# Patient Record
Sex: FEMALE | Race: WHITE | Employment: OTHER | URBAN - METROPOLITAN AREA
[De-identification: names, ages, dates, MRNs, and addresses within clinical notes are randomized per-mention and may not be internally consistent; named-entity substitution may affect disease eponyms.]

---

## 2017-01-09 ENCOUNTER — ONCOLOGY VISIT (OUTPATIENT)
Dept: ONCOLOGY | Facility: CLINIC | Age: 72
End: 2017-01-09
Payer: COMMERCIAL

## 2017-01-09 VITALS
DIASTOLIC BLOOD PRESSURE: 80 MMHG | TEMPERATURE: 97.2 F | RESPIRATION RATE: 20 BRPM | HEART RATE: 84 BPM | WEIGHT: 157 LBS | OXYGEN SATURATION: 99 % | BODY MASS INDEX: 26.16 KG/M2 | SYSTOLIC BLOOD PRESSURE: 146 MMHG | HEIGHT: 65 IN

## 2017-01-09 DIAGNOSIS — I15.9 SECONDARY HYPERTENSION: ICD-10-CM

## 2017-01-09 DIAGNOSIS — C54.1 ENDOMETRIAL CA (H): Primary | ICD-10-CM

## 2017-01-09 PROCEDURE — 99213 OFFICE O/P EST LOW 20 MIN: CPT | Performed by: NURSE PRACTITIONER

## 2017-01-09 ASSESSMENT — PAIN SCALES - GENERAL: PAINLEVEL: NO PAIN (0)

## 2017-01-09 NOTE — MR AVS SNAPSHOT
After Visit Summary   1/9/2017    Eula Freitas    MRN: [id number]           Patient Information     Date Of Birth          1945        Visit Information        Provider Department      1/9/2017 10:00 AM Lucretia Phillips APRN CNP [facility]        Today's Diagnoses     Endometrial ca (H)    -  1     Secondary hypertension            Follow-ups after your visit        Follow-up notes from your care team     Return in about 6 months (around 7/9/2017).      Your next 10 appointments already scheduled     Mar 14, 2017 11:15 AM   Return Visit with Jennifer Pham MD   [facility] ([facility])    [address] [place] [id number] [phone]            Jul 12, 2017 10:00 AM   Return Visit with SHANNAN Lopez CNP   [facility] ([facility])    [address] [place] [id number] [phone]              Who to contact     If you have questions or need follow up information about today's clinic visit or your schedule please contact [facility] directly at [phone].  Normal or non-critical lab and imaging results will be communicated to you by MyChart, letter or phone within 4 business days after the clinic has received the results. If you do not hear from us within 7 days, please contact the clinic through MyChart or phone. If you have a critical or abnormal lab result, we will notify you by phone as soon as possible.  Submit refill requests through [employer] or call your pharmacy and they will forward the refill request to us. Please allow 3 business days for your refill to be completed.          Additional Information About Your Visit        MyChart Information     [employer] is an electronic gateway that provides easy, online access to your medical records. With [employer], you can request a clinic appointment, read your test results, renew a prescription or  "communicate with your care team.     To sign up for [employer] visit the website at [email or web address]   You will be asked to enter the access code listed below, as well as some personal information. Please follow the directions to create your username and password.     Your access code is: [email or web address]  Expires: 2017 11:04 AM     Your access code will  in 90 days. If you need help or a new code, please contact your [facility] or call [phone] for assistance.        Care EveryWhere ID     This is your Care EveryWhere ID. This could be used by other organizations to access your [place] medical records  [id number]        Your Vitals Were     Pulse Temperature Respirations Height BMI (Body Mass Index) Pulse Oximetry    84 97.2  F (36.2  C) (Oral) 20 1.651 m (5' 5\") 26.13 kg/m2 99%    Last Period                   2012            Blood Pressure from Last 3 Encounters:   17 146/80   16 160/76   16 140/76    Weight from Last 3 Encounters:   17 71.215 kg (157 lb)   16 69.491 kg (153 lb 3.2 oz)   16 71.532 kg (157 lb 11.2 oz)              Today, you had the following     No orders found for display       Primary Care Provider Office Phone # Fax #    Eric MELITON Degroot PA-C [phone]       [facility] [address] [address] [address]        Thank you!     Thank you for choosing [facility]  for your care. Our goal is always to provide you with excellent care. Hearing back from our patients is one way we can continue to improve our services. Please take a few minutes to complete the written survey that you may receive in the mail after your visit with us. Thank you!             Your Updated Medication List - Protect others around you: Learn how to safely use, store and throw away your medicines at www.disposemymeds.org.          This list is accurate as of: 17 11:04 AM.  Always " use your most recent med list.                   Brand Name Dispense Instructions for use    atenolol 50 MG tablet    TENORMIN     1 TABLET DAILY       CALCIUM 600 + D PO      1200 mg daily       MULTIVITAMIN PO      1 tablet daily       vitamin D 2000 UNITS Caps

## 2017-01-09 NOTE — NURSING NOTE
"Eula Freitas is a 71 year old female who presents for:  Chief Complaint   Patient presents with     Oncology Clinic Visit     6 mo f/u endometrial        Initial Vitals:  /94 mmHg  Pulse 84  Temp(Src) 97.2  F (36.2  C) (Oral)  Resp 20  Ht 1.651 m (5' 5\")  Wt 71.215 kg (157 lb)  BMI 26.13 kg/m2  SpO2 99%  LMP 05/04/2012 Estimated body mass index is 26.13 kg/(m^2) as calculated from the following:    Height as of this encounter: 1.651 m (5' 5\").    Weight as of this encounter: 71.215 kg (157 lb).. Body surface area is 1.81 meters squared. BP completed using cuff size: regular  No Pain (0) Patient's last menstrual period was 05/04/2012. Allergies and medications reviewed.     Medications: Medication refills not needed today.  Pharmacy name entered into [facility]: [id number] [place] [address]    Comments:     8 minutes for nursing intake (face to face time)   ALONZO STEVE LPN          "

## 2017-01-09 NOTE — PROGRESS NOTES
Follow Up Notes on Referred Patient    Date: 2017       RE: Eula Freitas  : 1945  ESTEE: 2017      Eula Freitas is a 71 year old woman with a history of Stage IB, grade 1, endometrioid adenocarcinoma of the endometrium. She is here today for a surveillance visit.   In brief, Ms Freitas reported an onset of PMB and was seen by her PCP in early 2012. Evaluation included a EMBx which revealed a grade 1, endometrioid adenocarcinoma. She was subsequently referred to Dr. Lopez.  On 2012 she was taken to the OR and underwent a robotic assisted TLH/BSO/LND. Final pathology found her only risk factor to be that her DOI was 6/11mm total. She has been NIKOLAY since.  13: Pap NIL.    Today she comes to clinic and denies any concerning symptoms. She denies any vaginal bleeding, no changes in her bowel or bladder habits, no nausea/emesis, no lower extremity edema, and no difficulties eating or sleeping. She denies any abdominal discomfort/bloating, no fevers or chills, and no chest pain or shortness of breath. She is checking her BP at home and reports readings of 130-140/60-70; she does see her PCP for this. She is seeing her endocrinologist this week for her thyroid and DM. She is current with seeing her PCP and her mammogram is due. She is not sexually active and denies any dryness/discomfort.         Review of Systems:    Systemic           no weight changes; no fever; no chills; no night sweats; no appetite changes  Skin           no rashes, or lesions  Eye           no irritation; no changes in vision  Gabrielle-Laryngeal           no dysphagia; no hoarseness   Pulmonary    no cough; no shortness of breath  Cardiovascular    no chest pain; no palpitations; + HTN  Gastrointestinal    no diarrhea; no constipation; no abdominal pain; no changes in bowel habits; no blood in stool  Genitourinary   no urinary frequency; no urinary urgency; no dysuria; no pain; no abnormal vaginal discharge; no abnormal  vaginal bleeding  Breast    no breast discharge; no breast changes; no breast pain  Musculoskeletal    no myalgias; no arthralgias; no back pain  Psychiatric           no depressed mood; no anxiety    Hematologic               no tender lymph nodes; no noticeable swellings or lumps   Endocrine    no hot flashes; no heat/cold intolerance         Neurological   no tremor; no numbness and tingling; no headaches; no difficulty sleeping      Past Medical History:    Past Medical History   Diagnosis Date     HTN (hypertension)      Diabetes mellitus      Skin cancer      Endometrial cancer (H) 2012     Squamous cell carcinoma (H) 2003     right lower cheek     Actinic keratosis 2004     Dr. Lake         Past Surgical History:    Past Surgical History   Procedure Laterality Date     Resection of skin cancer       Davinci hysterectomy total, bilateral salpingo-oophorectomy, combined  5/4/2012     Procedure:COMBINED DAVINCI HYSTERECTOMY TOTAL, SALPINGO-OOPHORECTOMY; Davinci Total Laparoscopic Hysterectomy, Bilateral Salpingo- Oophorectomy Pelvic and dior aortic lymph node disection, pelvic washings, and cystoscopy; Surgeon:AINSLEY CAMPBELL; Location:[facility] OR     Dilation and curettage       Hysterectomy total abd, blanca salpingo-oophorectomy, node dissection, tumor debulking, combined  5/2012         Health Maintenance Due   Topic Date Due     TETANUS IMMUNIZATION (SYSTEM ASSIGNED)  03/08/1964     ADVANCE DIRECTIVE PLANNING Q5 YRS (NO INBASKET)  03/08/1964     HEPATITIS C SCREENING  03/08/1964     MAMMO SCREEN Q2 YR (SYSTEM ASSIGNED)  03/08/1986     LIPID SCREEN Q5 YR FEMALE (SYSTEM ASSIGNED)  03/08/1991     COLON CANCER SCREEN (SYSTEM ASSIGNED)  03/08/1996     FALL RISK ASSESSMENT  03/08/2011     DEXA SCAN SCREENING (SYSTEM ASSIGNED)  03/08/2011     PNEUMOCOCCAL (1 of 2 - PCV13) 03/08/2011     PAP Q1 YR  05/20/2014     INFLUENZA VACCINE (SYSTEM ASSIGNED)  09/01/2016       Current Medications:     Current Outpatient  "Prescriptions   Medication Sig Dispense Refill     Cholecalciferol (VITAMIN D) 2000 UNITS CAPS        ATENOLOL 50 MG OR TABS 1 TABLET DAILY       CALCIUM 600 + D OR 1200 mg daily       MULTIVITAMIN OR 1 tablet daily           Allergies:      No Known Allergies     Social History:     Social History   Substance Use Topics     Smoking status: Former Smoker -- 20 years     Quit date: 11/24/1983     Smokeless tobacco: Not on file     Alcohol Use: No       History   Drug Use No         Family History:       Family History   Problem Relation Age of Onset     Respiratory Mother      Pulmonary fibrosis     CANCER Mother      \"uterine cancer\" after miscarriage/ BCC legs     HEART DISEASE Father      DIABETES Father      Type 2     Breast Cancer Maternal Grandmother      CANCER Maternal Grandfather      Stomach     HEART DISEASE Paternal Grandmother      CANCER Sister      BCC     Hypertension Sister      Hypertension Sister      Hypertension Sister      CANCER Sister 57     ovarian cancer         Physical Exam:     /80 mmHg  Pulse 84  Temp(Src) 97.2  F (36.2  C) (Oral)  Resp 20  Ht 1.651 m (5' 5\")  Wt 71.215 kg (157 lb)  BMI 26.13 kg/m2  SpO2 99%  LMP 05/04/2012  Body mass index is 26.13 kg/(m^2).    General Appearance: healthy and alert, no distress     HEENT: no thyromegaly, no palpable nodules or masses        Cardiovascular: regular rate and rhythm, no gallops, rubs or murmurs     Respiratory: lungs clear, no rales, rhonchi or wheezes, normal diaphragmatic excursion    Musculoskeletal: extremities non tender and without edema    Skin: no lesions or rashes     Neurological: normal gait, no gross defects     Psychiatric: appropriate mood and affect                               Hematological: normal cervical, supraclavicular and inguinal lymph nodes     Gastrointestinal:       abdomen soft, non-tender, non-distended, no organomegaly or masses    Genitourinary: External genitalia and urethral meatus appears " normal.  Vagina is smooth without nodularity or masses.  Cervix surgically absent.  Bimanual exam reveal no masses, nodularity or fullness.  Recto-vaginal exam confirms these findings.      Assessment:    Eula Freitas is a 71 year old woman with a history of Stage IB, grade 1, endometrioid adenocarcinoma of the endometrium. She is here today for a surveillance visit.     15 minutes were spent with this patient, over 50% of that time was spent in symptom management, treatment planning and in counseling and coordination of care.    Plan:     1.)        Patient to RTC in 6 months for her next surveillance visit; that will be at her 5 year point and she can then extend her surveillance to annually. Reviewed signs and symptoms for when she should contact the clinic or seek additional care. Patient to contact the clinic with any questions or concerns in the interim.     2.) Genetic risk factors were assessed and the patient does not meet the qualifications for a referral; however, given her sister's ovarian cancer diagnosis; discussed her sister seeing a genetic counselor and then following up as indicated. She verbalized understanding.    3.) Labs and/or tests ordered include:  None today.      4.) Health maintenance issues addressed today include annual health maintenance and non-gyn issues with PCP. Reviewed BP recommendations and to continue to check her BP at home as well as at outside locations. Recheck BP was improved.     SHANNAN Melo, WHNP-BC, ANP-BC  Women's Health Nurse Practitioner  Adult Nurse Pracitioner  Gynecologic Oncology          CC  Patient Care Team:  Jeaneth Degroot PA-C as PCP - General (Physician Assistant)

## 2017-03-28 ENCOUNTER — OFFICE VISIT (OUTPATIENT)
Dept: DERMATOLOGY | Facility: CLINIC | Age: 72
End: 2017-03-28
Payer: COMMERCIAL

## 2017-03-28 DIAGNOSIS — Z85.828 HISTORY OF NONMELANOMA SKIN CANCER: Primary | ICD-10-CM

## 2017-03-28 DIAGNOSIS — L81.4 SOLAR LENTIGINOSIS: ICD-10-CM

## 2017-03-28 DIAGNOSIS — D18.01 CHERRY ANGIOMA: ICD-10-CM

## 2017-03-28 DIAGNOSIS — L82.1 SEBORRHEIC KERATOSIS: ICD-10-CM

## 2017-03-28 DIAGNOSIS — D48.5 NEOPLASM OF UNCERTAIN BEHAVIOR OF SKIN: ICD-10-CM

## 2017-03-28 PROCEDURE — 88305 TISSUE EXAM BY PATHOLOGIST: CPT | Mod: 90 | Performed by: DERMATOLOGY

## 2017-03-28 PROCEDURE — 11101 HC BIOPSY SKIN/SUBQ/MUC MEM, EACH ADDTL LESION: CPT | Performed by: DERMATOLOGY

## 2017-03-28 PROCEDURE — 11100 HC BIOPSY SKIN/SUBQ/MUC MEM, SINGLE LESION: CPT | Performed by: DERMATOLOGY

## 2017-03-28 PROCEDURE — 99213 OFFICE O/P EST LOW 20 MIN: CPT | Mod: 25 | Performed by: DERMATOLOGY

## 2017-03-28 NOTE — NURSING NOTE
Dermatology Rooming Note    Eula Freitas's goals for this visit include:   Chief Complaint   Patient presents with     Derm Problem     1 yr skin check. Has a spot on bith arms and RT side of neck thast she wants looked at       Is a scribe okay for this visit:YES    Are records needed for this visit(If yes, obtain release of information): Not applicable     Vitals: LMP 05/04/2012    Referring Provider:  ESTABLISHED PATIENT  No address on file

## 2017-03-28 NOTE — MR AVS SNAPSHOT
After Visit Summary   3/28/2017    Eula Freitas    MRN: [id number]           Patient Information     Date Of Birth          1945        Visit Information        Provider Department      3/28/2017 3:00 PM Jennifer Pham MD [facility]        Today's Diagnoses     History of nonmelanoma skin cancer    -  1    Neoplasm of uncertain behavior of skin        Seborrheic keratosis        Solar lentiginosis        Cherry angioma          Care Instructions    Wound Care After a Biopsy    What is a skin biopsy?  A skin biopsy allows the doctor to examine a very small piece of tissue under the microscope to determine the diagnosis and the best treatment for the skin condition. A local anesthetic (numbing medicine)  is injected with a very small needle into the skin area to be tested. A small piece of skin is taken from the area. Sometimes a suture (stitch) is used.     What are the risks of a skin biopsy?  I will experience scar, bleeding, swelling, pain, crusting and redness. I may experience incomplete removal or recurrence. Risks of this procedure are excessive bleeding, bruising, infection, nerve damage, numbness, thick (hypertrophic or keloidal) scar and non-diagnostic biopsy.    How should I care for my wound for the first 24 hours?    Keep the wound dry and covered for 24 hours    If it bleeds, hold direct pressure on the area for 15 minutes. If bleeding does not stop then go to the emergency room    Avoid strenuous exercise the first 1-2 days or as your doctor instructs you    How should I care for the wound after 24 hours?    After 24 hours, remove the bandage    You may bathe or shower as normal    If you had a scalp biopsy, you can shampoo as usual and can use shower water to clean the biopsy site daily    Clean the wound twice a day with gentle soap and water    Do not scrub, be gentle    Apply white petroleum/Vaseline after cleaning the wound with a cotton swab or a clean finger, and  keep the site covered with a Bandaid /bandage. Bandages are not necessary with a scalp biopsy    If you are unable to cover the site with a Bandaid /bandage, re-apply ointment 2-3 times a day to keep the site moist. Moisture will help with healing    Avoid strenuous activity for first 1-2 days    Avoid lakes, rivers, pools, and oceans until the stitches are removed or the site is healed    How do I clean my wound?    Wash hands for 15 minutes with soap or use hand  before all wound care    Clean the wound with gentle soap and water    Apply white petroleum/Vaseline  to wound after it is clean    Replace the Bandaid /bandage to keep the wound covered for the first few days or as instructed by your doctor    If you had a scalp biopsy, warm shower water to the area on a daily basis should suffice    What should I use to clean my wound?     Cotton-tipped applicators (Qtips )    White petroleum jelly (Vaseline ). Use a clean new container and use Q-tips to apply.    Bandaids   as needed    Gentle soap     How should I care for my wound long term?    Do not get your wound dirty    Keep up with wound care for one week or until the area is healed.    A small scab will form and fall off by itself when the area is completely healed. The area will be red and will become pink in color as it heals. Sun protection is very important for how your scar will turn out. Sunscreen with an SPF 30 or greater is recommended once the area is healed.    You should have some soreness but it should be mild and slowly go away over several days. Talk to your doctor about using tylenol for pain,    When should I call my doctor?  If you have increased:     Pain or swelling    Pus or drainage (clear or slightly yellow drainage is ok)    Temperature over 100F    Spreading redness or warmth around wound    When will I hear about my results?  The biopsy results can take 2-3 weeks to come back. The clinic will call you with the results, send  you a [employer] message, or have you schedule a follow-up clinic or phone time to discuss the results. Contact our clinics if you do not hear from us in 3 weeks.     Who should I call with questions?    [facility]: [phone]     [facility]: [phone]    For urgent needs outside of business hours call the [facility] at [phone] and ask for the dermatology resident on call            Follow-ups after your visit        Your next 10 appointments already scheduled     Jul 12, 2017 10:00 AM CDT   Return Visit with SHANNAN Lopez CNP   [facility] ([facility]    [address] [address] [place] [id number] [phone]              Who to contact     If you have questions or need follow up information about today's clinic visit or your schedule please contact [facility] directly at [phone].  Normal or non-critical lab and imaging results will be communicated to you by L2Chart, letter or phone within 4 business days after the clinic has received the results. If you do not hear from us within 7 days, please contact the clinic through L2Chart or phone. If you have a critical or abnormal lab result, we will notify you by phone as soon as possible.  Submit refill requests through [employer] or call your pharmacy and they will forward the refill request to us. Please allow 3 business days for your refill to be completed.          Additional Information About Your Visit        [employer] Information     [employer] is an electronic gateway that provides easy, online access to your medical records. With [employer], you can request a clinic appointment, read your test results, renew a prescription or communicate with your care team.     To sign up for [employer] visit the website at [email or web address]   You will be asked to enter the access code listed below, as well as some personal  information. Please follow the directions to create your username and password.     Your access code is: [email or web address]  Expires: 2017 12:04 PM     Your access code will  in 90 days. If you need help or a new code, please contact your [facility] or call [phone] for assistance.        Care EveryWhere ID     This is your Care EveryWhere ID. This could be used by other organizations to access your [place] medical records  [id number]        Your Vitals Were     Last Period                   2012            Blood Pressure from Last 3 Encounters:   17 146/80   16 160/76   16 140/76    Weight from Last 3 Encounters:   17 71.2 kg (157 lb)   16 69.5 kg (153 lb 3.2 oz)   16 71.5 kg (157 lb 11.2 oz)              We Performed the Following     BIOPSY SKIN/SUBQ/MUC MEM, EACH ADDTL LESION     BIOPSY SKIN/SUBQ/MUC MEM, SINGLE LESION     Surgical pathology exam        Primary Care Provider Office Phone # Fax #    Eric MELITON Degroot PA-C [phone]       [facility] [address] [address] [address]        Thank you!     Thank you for choosing [facility]  for your care. Our goal is always to provide you with excellent care. Hearing back from our patients is one way we can continue to improve our services. Please take a few minutes to complete the written survey that you may receive in the mail after your visit with us. Thank you!             Your Updated Medication List - Protect others around you: Learn how to safely use, store and throw away your medicines at www.disposemymeds.org.          This list is accurate as of: 3/28/17  3:38 PM.  Always use your most recent med list.                   Brand Name Dispense Instructions for use    atenolol 50 MG tablet    TENORMIN     1 TABLET DAILY       CALCIUM 600 + D PO      1200 mg daily       MULTIVITAMIN PO      1 tablet daily       vitamin D 2000  UNITS Caps

## 2017-03-28 NOTE — PATIENT INSTRUCTIONS

## 2017-03-31 LAB — COPATH REPORT: NORMAL

## 2017-07-11 NOTE — PROGRESS NOTES
Follow Up Notes on Referred Patient    Date: 2017         RE: Eula Freitas  : 1945  ESTEE: 2017      Eula Freitas is a 72 year old woman with a history of Stage IB, grade 1, endometrioid adenocarcinoma of the endometrium. She is here today for a surveillance visit.   In brief, Ms Freitas reported an onset of PMB and was seen by her PCP in early 2012. Evaluation included a EMBx which revealed a grade 1, endometrioid adenocarcinoma. She was subsequently referred to Dr. Lopez.  On 2012 she was taken to the OR and underwent a robotic assisted TLH/BSO/LND. Final pathology found her only risk factor to be that her DOI was 6/11mm total. She has been NIKOLAY since.  13: Pap NIL.      Today she comes to clinic feeling well. She denies any vaginal bleeding, no changes in her bowel or bladder habits, no nausea/emesis, no lower extremity edema, and no difficulties eating or sleeping. She denies any abdominal discomfort/bloating, no fevers or chills, and no chest pain or shortness of breath. She is current with her health screenings and is not sexually active; she denies any vaginal or vulvar dryness. She continues to monitor her BP at home and reports readings of 130-140's/60-70's; she is taking her Atenolol. She is being seen by Endocrine for her thyroid and DM. She is very excited to be 5 years out.       Review of Systems:    Systemic           no weight changes; no fever; no chills; no night sweats; no appetite changes  Skin           no rashes, or lesions  Eye           no irritation; no changes in vision  Gabrielle-Laryngeal           no dysphagia; no hoarseness   Pulmonary    no cough; no shortness of breath  Cardiovascular    no chest pain; no palpitations; + HTN  Gastrointestinal    no diarrhea; no constipation; no abdominal pain; no changes in bowel habits; no blood in stool  Genitourinary   no urinary frequency; no urinary urgency; no dysuria; no pain; no abnormal vaginal discharge; no  abnormal vaginal bleeding  Breast    no breast discharge; no breast changes; no breast pain  Musculoskeletal    no myalgias; no arthralgias; no back pain  Psychiatric           no depressed mood; no anxiety    Hematologic              no tender lymph nodes; no noticeable swellings or lumps   Endocrine    no hot flashes; no heat/cold intolerance         Neurological   no tremor; no numbness and tingling; no headaches; no difficulty sleeping      Past Medical History:    Past Medical History:   Diagnosis Date     Actinic keratosis 2004    Dr. Lake     Diabetes mellitus      Endometrial cancer (H) 2012     HTN (hypertension)      Skin cancer      Squamous cell carcinoma 2003    right lower cheek         Past Surgical History:    Past Surgical History:   Procedure Laterality Date     DAVINCI HYSTERECTOMY TOTAL, BILATERAL SALPINGO-OOPHORECTOMY, COMBINED  5/4/2012    Procedure:COMBINED DAVINCI HYSTERECTOMY TOTAL, SALPINGO-OOPHORECTOMY; Davinci Total Laparoscopic Hysterectomy, Bilateral Salpingo- Oophorectomy Pelvic and dior aortic lymph node disection, pelvic washings, and cystoscopy; Surgeon:AINSLEY CAMPBELL; Location[facility]     DILATION AND CURETTAGE       HYSTERECTOMY TOTAL ABD, NILSON SALPINGO-OOPHORECTOMY, NODE DISSECTION, TUMOR DEBULKING, COMBINED  5/2012     resection of skin cancer           Health Maintenance Due   Topic Date Due     TETANUS IMMUNIZATION (SYSTEM ASSIGNED)  03/08/1964     ADVANCE DIRECTIVE PLANNING Q5 YRS  03/08/1964     HEPATITIS C SCREENING  03/08/1964     MAMMO SCREEN Q2 YR (SYSTEM ASSIGNED)  03/08/1986     LIPID SCREEN Q5 YR FEMALE (SYSTEM ASSIGNED)  03/08/1991     COLON CANCER SCREEN (SYSTEM ASSIGNED)  03/08/1996     FALL RISK ASSESSMENT  03/08/2011     DEXA SCAN SCREENING (SYSTEM ASSIGNED)  03/08/2011     PNEUMOCOCCAL (1 of 2 - PCV13) 03/08/2011     PAP Q1 YR  05/20/2014       Current Medications:     Current Outpatient Prescriptions   Medication Sig Dispense Refill     Cholecalciferol  "(VITAMIN D) 2000 UNITS CAPS        ATENOLOL 50 MG OR TABS 1 TABLET DAILY       CALCIUM 600 + D OR 1200 mg daily       MULTIVITAMIN OR 1 tablet daily           Allergies:      No Known Allergies     Social History:     Social History   Substance Use Topics     Smoking status: Former Smoker     Years: 20.00     Quit date: 11/24/1983     Smokeless tobacco: Not on file     Alcohol use No       History   Drug Use No         Family History:       Family History   Problem Relation Age of Onset     Respiratory Mother      Pulmonary fibrosis     CANCER Mother      \"uterine cancer\" after miscarriage/ BCC legs     HEART DISEASE Father      DIABETES Father      Type 2     Breast Cancer Maternal Grandmother      CANCER Maternal Grandfather      Stomach     HEART DISEASE Paternal Grandmother      CANCER Sister      BCC     Hypertension Sister      Hypertension Sister      Hypertension Sister      CANCER Sister 57     ovarian cancer         Physical Exam:     /76  Pulse 76  Temp 97.1  F (36.2  C) (Oral)  Resp 20  Ht 1.651 m (5' 5\")  Wt 71.2 kg (157 lb)  LMP 05/04/2012  SpO2 99%  BMI 26.13 kg/m2  Body mass index is 26.13 kg/(m^2).    General Appearance: healthy and alert, no distress     HEENT: no thyromegaly, no palpable nodules or masses        Cardiovascular: regular rate and rhythm, no gallops, rubs or murmurs     Respiratory: lungs clear, no rales, rhonchi or wheezes, normal diaphragmatic excursion    Musculoskeletal: extremities non tender and without edema    Skin: no lesions or rashes     Neurological: normal gait, no gross defects     Psychiatric: appropriate mood and affect                               Hematological: normal cervical, supraclavicular and inguinal lymph nodes     Gastrointestinal:       abdomen soft, non-tender, non-distended, no organomegaly or masses    Genitourinary: External genitalia and urethral meatus appears normal.  Vagina is smooth without nodularity or masses.  Cervix surgically " absent.  Bimanual exam reveal no masses, nodularity or fullness.  Recto-vaginal exam confirms these findings.      Assessment:    Eula Freitas is a 72 year old woman with a history of Stage IB, grade 1, endometrioid adenocarcinoma of the endometrium. She is here today for a surveillance visit.     20 minutes were spent with this patient, over 50% of that time was spent in symptom management, treatment planning and in counseling and coordination of care.      Plan:     1.)        She is now 5 year out and can extend her surveillance to annually; this can be done here or with her OB. She will continue to have a pelvic/rectal exam at each visit. Reviewed recommendations from [facility] not to perform surveillance pap smears in women diagnosed with endometrial cancer as this does not improve detection of local recurrence. Reviewed signs and symptoms for when she should contact the clinic or seek additional care. Patient to contact the clinic with any questions or concerns at any time. She was given a handout on surveillance guidelines to give to her OB.      2.) Genetic risk factors were assessed and the patient does not meet the qualifications for a referral.  However, given her sister's ovarian cancer diagnosis; discussed her sister seeing a genetic counselor and then following up as indicated. She verbalized understanding.    3.) Labs and/or tests ordered include:  None today.      4.) Health maintenance issues addressed today include annual health maintenance and non-gynecologic issues with PCP. Continue to monitor BP at home.     SHANNAN Melo, WHNP-BC, ANP-BC  Women's Health Nurse Practitioner  Adult Nurse Pracitioner  Gynecologic Oncology          CC  Patient Care Team:  Jeaneth Degroot PA-C as PCP - General (Physician Assistant)

## 2017-07-12 ENCOUNTER — ONCOLOGY VISIT (OUTPATIENT)
Dept: ONCOLOGY | Facility: CLINIC | Age: 72
End: 2017-07-12
Payer: COMMERCIAL

## 2017-07-12 VITALS
SYSTOLIC BLOOD PRESSURE: 140 MMHG | WEIGHT: 157 LBS | OXYGEN SATURATION: 99 % | TEMPERATURE: 97.1 F | HEIGHT: 65 IN | BODY MASS INDEX: 26.16 KG/M2 | RESPIRATION RATE: 20 BRPM | HEART RATE: 76 BPM | DIASTOLIC BLOOD PRESSURE: 76 MMHG

## 2017-07-12 DIAGNOSIS — I15.9 SECONDARY HYPERTENSION: ICD-10-CM

## 2017-07-12 DIAGNOSIS — C54.1 ENDOMETRIAL CA (H): Primary | ICD-10-CM

## 2017-07-12 PROCEDURE — 99213 OFFICE O/P EST LOW 20 MIN: CPT | Performed by: NURSE PRACTITIONER

## 2017-07-12 ASSESSMENT — PAIN SCALES - GENERAL: PAINLEVEL: NO PAIN (0)

## 2017-07-12 NOTE — NURSING NOTE
"Oncology Rooming Note    July 12, 2017 10:05 AM   Eula Freitas is a 72 year old female who presents for:    Chief Complaint   Patient presents with     Oncology Clinic Visit     6 mo f/u endometrial     Initial Vitals: LMP 05/04/2012 Estimated body mass index is 26.13 kg/(m^2) as calculated from the following:    Height as of 1/9/17: 1.651 m (5' 5\").    Weight as of 1/9/17: 71.2 kg (157 lb). There is no height or weight on file to calculate BSA.  Data Unavailable Comment: Data Unavailable   Patient's last menstrual period was 05/04/2012.  Allergies reviewed: Yes  Medications reviewed: Yes    Medications: Medication refills not needed today.  Pharmacy name entered into [facility]: [id number] [place] [address]    Clinical concerns:    8 minutes for nursing intake (face to face time)     ALONZO STEVE LPN              "

## 2017-07-12 NOTE — MR AVS SNAPSHOT
After Visit Summary   7/12/2017    Eula Freitas    MRN: [id number]           Patient Information     Date Of Birth          1945        Visit Information        Provider Department      7/12/2017 10:00 AM Lucretia Phillips APRN CNP [facility]        Today's Diagnoses     Endometrial ca (H)    -  1       Follow-ups after your visit        Follow-up notes from your care team     Return if symptoms worsen or fail to improve.      Your next 10 appointments already scheduled     Mar 29, 2018  9:45 AM CDT   [facility] Visit with Jennifer Pham MD   [facility]    [address] [address] [place] [id number] [phone]              Who to contact     If you have questions or need follow up information about today's clinic visit or your schedule please contact [facility] directly at [phone].  Normal or non-critical lab and imaging results will be communicated to you by MyChart, letter or phone within 4 business days after the clinic has received the results. If you do not hear from us within 7 days, please contact the clinic through [employer] or phone. If you have a critical or abnormal lab result, we will notify you by phone as soon as possible.  Submit refill requests through [employer] or call your pharmacy and they will forward the refill request to us. Please allow 3 business days for your refill to be completed.          Additional Information About Your Visit        MyChart Information     [employer] is an electronic gateway that provides easy, online access to your medical records. With [employer], you can request a clinic appointment, read your test results, renew a prescription or communicate with your care team.     To sign up for [email or web address] visit the website at [email or web address]   You will be asked to enter the access code listed below, as well as some personal information. Please follow the directions to  "create your username and password.     Your access code is: [email or web address]  Expires: 10/10/2017 10:29 AM     Your access code will  in 90 days. If you need help or a new code, please contact your [facility] or call [phone] for assistance.        Care EveryWhere ID     This is your Care EveryWhere ID. This could be used by other organizations to access your [facility] medical records  [id number]        Your Vitals Were     Pulse Temperature Respirations Height Last Period Pulse Oximetry    76 97.1  F (36.2  C) (Oral) 20 1.651 m (5' 5\") 2012 99%    BMI (Body Mass Index)                   26.13 kg/m2            Blood Pressure from Last 3 Encounters:   17 140/76   17 146/80   16 160/76    Weight from Last 3 Encounters:   17 71.2 kg (157 lb)   17 71.2 kg (157 lb)   16 69.5 kg (153 lb 3.2 oz)              Today, you had the following     No orders found for display       Primary Care Provider Office Phone # Fax #    Eric MELITON Degroot PA-C [phone]       [facility] [address] [address] [address]        Equal Access to Services     JESSICA SURESH : Hadii aad ku hadasho Soomaali, waaxda luqadaha, qaybta kaalmada adeegyada, darci craven . So [facility] [phone].    ATENCIÓN: Si habla español, tiene a crury disposición servicios gratuitos de asistencia lingüística. Llame al [phone].    We comply with applicable federal civil rights laws and [place] laws. We do not discriminate on the basis of race, color, national origin, age, disability sex, sexual orientation or gender identity.            Thank you!     Thank you for choosing [facility]  for your care. Our goal is always to provide you with excellent care. Hearing back from our patients is one way we can continue to improve our services. Please take a few minutes to complete the written survey that you may " receive in the mail after your visit with us. Thank you!             Your Updated Medication List - Protect others around you: Learn how to safely use, store and throw away your medicines at www.disposemymeds.org.          This list is accurate as of: 7/12/17 10:29 AM.  Always use your most recent med list.                   Brand Name Dispense Instructions for use Diagnosis    atenolol 50 MG tablet    TENORMIN     1 TABLET DAILY        CALCIUM 600 + D PO      1200 mg daily        MULTIVITAMIN PO      1 tablet daily        vitamin D 2000 UNITS Caps

## 2018-03-29 ENCOUNTER — OFFICE VISIT (OUTPATIENT)
Dept: DERMATOLOGY | Facility: CLINIC | Age: 73
End: 2018-03-29
Payer: COMMERCIAL

## 2018-03-29 DIAGNOSIS — L82.1 SEBORRHEIC KERATOSIS: Primary | ICD-10-CM

## 2018-03-29 DIAGNOSIS — D18.01 CHERRY ANGIOMA: ICD-10-CM

## 2018-03-29 DIAGNOSIS — Z85.828 HISTORY OF NONMELANOMA SKIN CANCER: ICD-10-CM

## 2018-03-29 DIAGNOSIS — L81.4 SOLAR LENTIGINOSIS: ICD-10-CM

## 2018-03-29 PROCEDURE — 99213 OFFICE O/P EST LOW 20 MIN: CPT | Performed by: DERMATOLOGY

## 2018-03-29 NOTE — MR AVS SNAPSHOT
After Visit Summary   3/29/2018    Eula Freitas    MRN: [id number]           Patient Information     Date Of Birth          1945        Visit Information        Provider Department      3/29/2018 9:45 AM Jennifer Pham MD [facility]        Today's Diagnoses     Seborrheic keratosis    -  1    Cherry angioma        Solar lentiginosis        History of nonmelanoma skin cancer           Follow-ups after your visit        Follow-up notes from your care team     Return in about 1 year (around 3/29/2019) for Skin Check - Hx of NMSC.      Your next 10 appointments already scheduled     Mar 28, 2019  9:45 AM CDT   [facility] Visit with Jennifer Pham MD   [facility]    [address] [address] [place] [address] [phone]              Who to contact     If you have questions or need follow up information about today's clinic visit or your schedule please contact [facility] directly at [phone].  Normal or non-critical lab and imaging results will be communicated to you by MyChart, letter or phone within 4 business days after the clinic has received the results. If you do not hear from us within 7 days, please contact the clinic through MyChart or phone. If you have a critical or abnormal lab result, we will notify you by phone as soon as possible.  Submit refill requests through [employer] or call your pharmacy and they will forward the refill request to us. Please allow 3 business days for your refill to be completed.          Additional Information About Your Visit        [employer] Information     [employer] is an electronic gateway that provides easy, online access to your medical records. With [employer], you can request a clinic appointment, read your test results, renew a prescription or communicate with your care team.     To sign up for [employer] visit the website at [email or web address]   You will be asked to enter  the access code listed below, as well as some personal information. Please follow the directions to create your username and password.     Your access code is: [email or web address]  Expires: 2018 10:07 AM     Your access code will  in 90 days. If you need help or a new code, please contact your [facility] or call [phone] for assistance.        Care EveryWhere ID     This is your Care EveryWhere ID. This could be used by other organizations to access your [facility] medical records  [id number]        Your Vitals Were     Last Period                   2012            Blood Pressure from Last 3 Encounters:   17 140/76   17 146/80   16 160/76    Weight from Last 3 Encounters:   17 71.2 kg (157 lb)   17 71.2 kg (157 lb)   16 69.5 kg (153 lb 3.2 oz)              Today, you had the following     No orders found for display       Primary Care Provider Office Phone # Fax #    Eric MELITON Degroot PA-C [phone]       [facility] [address] [address] [address]        Equal Access to Services     HARDIK SURESH : Hadii aad ku hadasho Soomaali, waaxda luqadaha, qaybta kaalmada adeegyada, darci craven . So [facility] [phone].    ATENCIÓN: Si habla español, tiene a curry disposición servicios gratuitos de asistencia lingüística. Llame al [phone].    We comply with applicable federal civil rights laws and [place] laws. We do not discriminate on the basis of race, color, national origin, age, disability, sex, sexual orientation, or gender identity.            Thank you!     Thank you for choosing [facility]  for your care. Our goal is always to provide you with excellent care. Hearing back from our patients is one way we can continue to improve our services. Please take a few minutes to complete the written survey that you may receive in the mail after your visit with us.  Thank you!             Your Updated Medication List - Protect others around you: Learn how to safely use, store and throw away your medicines at www.disposemymeds.org.          This list is accurate as of 3/29/18 10:11 AM.  Always use your most recent med list.                   Brand Name Dispense Instructions for use Diagnosis    atenolol 50 MG tablet    TENORMIN     1 TABLET DAILY        CALCIUM 600 + D PO      1200 mg daily        MULTIVITAMIN PO      1 tablet daily        vitamin D 2000 UNITS Caps

## 2018-03-29 NOTE — LETTER
3/29/2018         RE: Eula Freitas  [address] [address] [place] [id number]        Dear Colleague,    Thank you for referring your patient, Eula Freitas, to the [facility]. Please see a copy of my visit note below.    [facility] Dermatology Note      Dermatology Problem List:  1. SCCIS, right neck  -s/p biopsy 2003 and excision  2. Actinic keratosis  -s/p cryotherapy    Encounter Date: Mar 29, 2018    CC:  Chief Complaint   Patient presents with     Skin Check     Hx of AK & SCC - lesion on right clavicle         History of Present Illness:  Ms. Eula Freitas is a 73 year old female who presents as a follow-up for history of SCC and AK. The patient was last seen  3/2017. She reports new spot on the right clavicle that she would like checked.The spot has been there for a long time and she keeps hitting it with her finger and the patient reports it scabs over.      Past Medical History:   Patient Active Problem List   Diagnosis     History of Squamous Cell Carcinoma in situ of right neck excised by Dr. Lake in 2003     Melanocytic nevus     Xerosis cutis     History of actinic keratoses     Endometrial ca (H)     Multiple nevi     Seborrheic keratosis     Secondary hypertension     Past Medical History:   Diagnosis Date     Actinic keratosis 2004    Dr. Lake     Diabetes mellitus      Endometrial cancer (H) 2012     HTN (hypertension)      Skin cancer      Squamous cell carcinoma 2003    right lower cheek     Past Surgical History:   Procedure Laterality Date     DAVINCI HYSTERECTOMY TOTAL, BILATERAL SALPINGO-OOPHORECTOMY, COMBINED  5/4/2012    Procedure:COMBINED DAVINCI HYSTERECTOMY TOTAL, SALPINGO-OOPHORECTOMY; Davinci Total Laparoscopic Hysterectomy, Bilateral Salpingo- Oophorectomy Pelvic and dior aortic lymph node disection, pelvic washings, and cystoscopy; Surgeon:AINSLEY CAMPBELL; Location[facility]     DILATION AND CURETTAGE       HYSTERECTOMY TOTAL ABD, NILSON  SALPINGO-OOPHORECTOMY, NODE DISSECTION, TUMOR DEBULKING, COMBINED  5/2012     resection of skin cancer       Social History:  Retired, 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 No additional skin concerns.    Physical exam:  Vitals: LMP 05/04/2012  GEN: This is a well developed, well-nourished female in no acute distress, in a pleasant mood.    SKIN: Total skin excluding the undergarment areas was performed. The exam included the head/face, neck, both arms, chest, back, abdomen, both legs, digits and/or nails. Declines genital exam   -There are waxy stuck on tan to brown papules on the trunk and extremities including right calvicle.  -Scattered brown macules on sun exposed areas.  -There are bright red some shaped papules scattered on the trunk.   -There is a well healed surgical scar without erythema, nodularity or telangiectasias on the right clavicle.  -No other lesions of concern on areas examined.     Impression/Plan:  1. History of nonmelanoma skin cancer and AKs, no clincial evidence or recurrence    Recommend sunscreens SPF #30 or greater, protective clothing and avoidance of tanning beds.    2. Seborrheic keratosis, right clavicle, trunk and extremities     Declines cryo for right clavicle   3. Solar lentigines,     No further intervention required at this time.   4. Cherry angiomas    No further intervention required at this time.       Follow up in 1 year, earlier for new or changing lesions.     Staff Involved:  Scribe/Staff    Scribe  Disclosure:   I, Lizzie Ramirezlive, am serving as a scribe to document services personally performed by Dr. Jennifer Pham, based on data collection and the provider's statements to me.     Provider Disclosure:   The documentation recorded by the scribe accurately reflects the services I personally performed and the decisions made by me.    Jennifer Pham MD    Department of Dermatology  [facility]: Phone: [phone], Fax:[phone]  [facility]: Phone: [phone], Fax: [phone]        Again, thank you for allowing me to participate in the care of your patient.        Sincerely,        Jennifer Pham MD

## 2018-03-29 NOTE — PROGRESS NOTES
[facility] Dermatology Note      Dermatology Problem List:  1. SCCIS, right neck  -s/p biopsy 2003 and excision  2. Actinic keratosis  -s/p cryotherapy    Encounter Date: Mar 29, 2018    CC:  Chief Complaint   Patient presents with     Skin Check     Hx of AK & SCC - lesion on right clavicle         History of Present Illness:  Ms. Eula Freitas is a 73 year old female who presents as a follow-up for history of SCC and AK. The patient was last seen  3/2017. She reports new spot on the right clavicle that she would like checked.The spot has been there for a long time and she keeps hitting it with her finger and the patient reports it scabs over.      Past Medical History:   Patient Active Problem List   Diagnosis     History of Squamous Cell Carcinoma in situ of right neck excised by Dr. Lake in 2003     Melanocytic nevus     Xerosis cutis     History of actinic keratoses     Endometrial ca (H)     Multiple nevi     Seborrheic keratosis     Secondary hypertension     Past Medical History:   Diagnosis Date     Actinic keratosis 2004    Dr. Lake     Diabetes mellitus      Endometrial cancer (H) 2012     HTN (hypertension)      Skin cancer      Squamous cell carcinoma 2003    right lower cheek     Past Surgical History:   Procedure Laterality Date     DAVINCI HYSTERECTOMY TOTAL, BILATERAL SALPINGO-OOPHORECTOMY, COMBINED  5/4/2012    Procedure:COMBINED DAVINCI HYSTERECTOMY TOTAL, SALPINGO-OOPHORECTOMY; Davinci Total Laparoscopic Hysterectomy, Bilateral Salpingo- Oophorectomy Pelvic and dior aortic lymph node disection, pelvic washings, and cystoscopy; Surgeon:AINSLEY CAMPBELL; Location:[facility]     DILATION AND CURETTAGE       HYSTERECTOMY TOTAL ABD, NILSON SALPINGO-OOPHORECTOMY, NODE DISSECTION, TUMOR DEBULKING, COMBINED  5/2012     resection of skin cancer       Social History:  Retired, 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 No additional skin concerns.    Physical exam:  Vitals: LMP 05/04/2012  GEN: This is a well developed, well-nourished female in no acute distress, in a pleasant mood.    SKIN: Total skin excluding the undergarment areas was performed. The exam included the head/face, neck, both arms, chest, back, abdomen, both legs, digits and/or nails. Declines genital exam   -There are waxy stuck on tan to brown papules on the trunk and extremities including right calvicle.  -Scattered brown macules on sun exposed areas.  -There are bright red some shaped papules scattered on the trunk.   -There is a well healed surgical scar without erythema, nodularity or telangiectasias on the right clavicle.  -No other lesions of concern on areas examined.     Impression/Plan:  1. History of nonmelanoma skin cancer and AKs, no clincial evidence or recurrence    Recommend sunscreens SPF #30 or greater, protective clothing and avoidance of tanning beds.    2. Seborrheic keratosis, right clavicle, trunk and extremities     Declines cryo for right clavicle   3. Solar lentigines,     No further intervention required at this time.   4. Cherry angiomas    No further intervention required at this time.       Follow up in 1 year, earlier for new or changing lesions.     Staff Involved:  Scribe/Staff    Scribe Disclosure:   Lizzie GO, am serving as a scribe to document services personally performed by Dr. Jennifer Pham, based on data collection and the provider's statements to me.     Provider Disclosure:   The documentation recorded by the  saviibe accurately reflects the services I personally performed and the decisions made by me.    Jennifer Pham MD    Department of Dermatology  [facility] [place][note 2]: Phone: [phone], Fax:[phone]  [facility]: Phone: [phone], Fax: [phone]

## 2018-03-29 NOTE — NURSING NOTE
Dermatology Rooming Note    Eula Freitas's goals for this visit include:   Chief Complaint   Patient presents with     Skin Check     Hx of AK & SCC - lesion on right clavicle       Is a scribe okay for this visit: YES    Are records needed for this visit(If yes, obtain release of information): NO     Vitals: LMP 05/04/2012    Referring Provider:  ESTABLISHED PATIENT  No address on file    Breann Coates CMA

## 2019-03-28 ENCOUNTER — OFFICE VISIT (OUTPATIENT)
Dept: DERMATOLOGY | Facility: CLINIC | Age: 74
End: 2019-03-28
Payer: COMMERCIAL

## 2019-03-28 DIAGNOSIS — D18.01 CHERRY ANGIOMA: ICD-10-CM

## 2019-03-28 DIAGNOSIS — L82.1 SEBORRHEIC KERATOSIS: ICD-10-CM

## 2019-03-28 DIAGNOSIS — Z85.828 HISTORY OF NONMELANOMA SKIN CANCER: Primary | ICD-10-CM

## 2019-03-28 DIAGNOSIS — L81.4 SOLAR LENTIGINOSIS: ICD-10-CM

## 2019-03-28 PROCEDURE — 99213 OFFICE O/P EST LOW 20 MIN: CPT | Performed by: DERMATOLOGY

## 2019-03-28 ASSESSMENT — PAIN SCALES - GENERAL: PAINLEVEL: NO PAIN (0)

## 2019-03-28 NOTE — PROGRESS NOTES
[facility] Dermatology Note      Dermatology Problem List:  1. SCCIS, right neck  -s/p biopsy 2003 and excision  2. Actinic keratosis  -s/p cryotherapy    Encounter Date: Mar 28, 2019    CC:  Chief Complaint   Patient presents with     RECHECK     Eula is returning for a recheck; no areas of concern today         History of Present Illness:  Ms. Eula Freitas is a 74 year old female who presents as a follow-up for history of SCC and AK. The patient was last seen 3/29/18 for her last skin check. She reports today that she has no major areas of concern on her skin. Nothing bleeding, crusting, changing. No changes in medical problems. No specific concerns to address today.     Past Medical History:   Patient Active Problem List   Diagnosis     History of Squamous Cell Carcinoma in situ of right neck excised by Dr. Lake in 2003     Melanocytic nevus     Xerosis cutis     History of actinic keratoses     Endometrial ca (H)     Multiple nevi     Seborrheic keratosis     Secondary hypertension     Past Medical History:   Diagnosis Date     Actinic keratosis 2004    Dr. Lake     Diabetes mellitus      Endometrial cancer (H) 2012     HTN (hypertension)      Skin cancer      Squamous cell carcinoma 2003    right lower cheek     Past Surgical History:   Procedure Laterality Date     DAVINCI HYSTERECTOMY TOTAL, BILATERAL SALPINGO-OOPHORECTOMY, COMBINED  5/4/2012    Procedure:COMBINED DAVINCI HYSTERECTOMY TOTAL, SALPINGO-OOPHORECTOMY; Davinci Total Laparoscopic Hysterectomy, Bilateral Salpingo- Oophorectomy Pelvic and dior aortic lymph node disection, pelvic washings, and cystoscopy; Surgeon:AINSLEY CAMPBELL; Location:[facility]     DILATION AND CURETTAGE       HYSTERECTOMY TOTAL ABD, NILSON SALPINGO-OOPHORECTOMY, NODE DISSECTION, TUMOR DEBULKING, COMBINED  5/2012     resection of skin cancer       Social History:  Retired, Reviewed.     Family History:  There is a family history of non-melanoma skin cancer in the  patient's sister and mother, BCC . Reviewed.     Medications:  Current Outpatient Medications   Medication Sig Dispense Refill     ATENOLOL 50 MG OR TABS 1 TABLET DAILY       CALCIUM 600 + D OR 1200 mg daily       Cholecalciferol (VITAMIN D) 2000 UNITS CAPS        MULTIVITAMIN OR 1 tablet daily       Allergies   Allergen Reactions     Aspirin      Epistaxis     Lisinopril Cough       Review of Systems:  -Const: Feels well otherwise, no recent illnesses. Revieweis.   -Skin: As above in HPI. No additional skin concerns.    Physical exam:  Vitals: LMP 05/04/2012   GEN: This is a well developed, well-nourished female in no acute distress, in a pleasant mood.    SKIN: Total skin excluding the undergarment areas was performed. The exam included the head/face, neck, both arms, chest, back, abdomen, both legs, digits and/or nails. Pt has make up on face - declines removal. Declines genital exam.   - There are waxy stuck on tan to brown papules on the trunk.  - Scattered brown macules on sun exposed areas.  - There is no erythema, telangectasias, nodularity, or pigmentation on the right neck.  - There are dome shaped bright red papules on the trunk.   -No other lesions of concern on areas examined.     Impression/Plan:  1. History of nonmelanoma skin cancer, no evidence of recurrence.     Recommend sunscreens SPF #30 or greater, protective clothing and avoidance of tanning beds.Reviewed.     2. Solar lentigines, cherry angiomas, seborrheic keratoses    No further intervention needed.     Follow up in 1 year. Patient woud like to extend to 1.5 , she will report any lesions earlier.      Staff Involved:  Scribe/Staff    Scribe Disclosure  I, Leonel Isbell, am serving as a scribe to document services personally performed by Dr. Jennifer Pham MD, based on data collection and the provider's statements to me.     Provider Disclosure:   The documentation recorded by the scribe accurately reflects the services I personally performed  and the decisions made by me.    Jennifer Pham MD    Department of Dermatology  [facility]: Phone: [phone], Fax:[phone]  [facility]: Phone: [phone], Fax: [phone]

## 2019-03-28 NOTE — NURSING NOTE
Eula Freitas's goals for this visit include:   Chief Complaint   Patient presents with     RECHECK     Eula is returning for a recheck; no areas of concern today       She requests these members of her care team be copied on today's visit information:     PCP: Jeaneth Degroot    Referring Provider:  ESTABLISHED PATIENT  No address on file    LMP 05/04/2012     Do you need any medication refills at today's visit? No  Ericka Tay LPN

## 2019-03-28 NOTE — LETTER
3/28/2019         RE: Eula Freitas  [address] [place] [id number]        Dear Colleague,    Thank you for referring your patient, Eula Freitas, to the [facility]. Please see a copy of my visit note below.    [facility] Dermatology Note      Dermatology Problem List:  1. SCCIS, right neck  -s/p biopsy 2003 and excision  2. Actinic keratosis  -s/p cryotherapy    Encounter Date: Mar 28, 2019    CC:  Chief Complaint   Patient presents with     RECHECK     Eula is returning for a recheck; no areas of concern today         History of Present Illness:  Ms. Eula Freitas is a 74 year old female who presents as a follow-up for history of SCC and AK. The patient was last seen 3/29/18 for her last skin check. She reports today that she has no major areas of concern on her skin. Nothing bleeding, crusting, changing. No changes in medical problems. No specific concerns to address today.     Past Medical History:   Patient Active Problem List   Diagnosis     History of Squamous Cell Carcinoma in situ of right neck excised by Dr. Lake in 2003     Melanocytic nevus     Xerosis cutis     History of actinic keratoses     Endometrial ca (H)     Multiple nevi     Seborrheic keratosis     Secondary hypertension     Past Medical History:   Diagnosis Date     Actinic keratosis 2004    Dr. Lake     Diabetes mellitus      Endometrial cancer (H) 2012     HTN (hypertension)      Skin cancer      Squamous cell carcinoma 2003    right lower cheek     Past Surgical History:   Procedure Laterality Date     DAVINCI HYSTERECTOMY TOTAL, BILATERAL SALPINGO-OOPHORECTOMY, COMBINED  5/4/2012    Procedure:COMBINED DAVINCI HYSTERECTOMY TOTAL, SALPINGO-OOPHORECTOMY; Davinci Total Laparoscopic Hysterectomy, Bilateral Salpingo- Oophorectomy Pelvic and dior aortic lymph node disection, pelvic washings, and cystoscopy; Surgeon:AINSLEY CAMPBELL; Location:[facility]     DILATION AND CURETTAGE        HYSTERECTOMY TOTAL ABD, NILSON SALPINGO-OOPHORECTOMY, NODE DISSECTION, TUMOR DEBULKING, COMBINED  5/2012     resection of skin cancer       Social History:  Retired, Reviewed.     Family History:  There is a family history of non-melanoma skin cancer in the patient's sister and mother, BCC . Reviewed.     Medications:  Current Outpatient Medications   Medication Sig Dispense Refill     ATENOLOL 50 MG OR TABS 1 TABLET DAILY       CALCIUM 600 + D OR 1200 mg daily       Cholecalciferol (VITAMIN D) 2000 UNITS CAPS        MULTIVITAMIN OR 1 tablet daily       Allergies   Allergen Reactions     Aspirin      Epistaxis     Lisinopril Cough       Review of Systems:  -Const: Feels well otherwise, no recent illnesses. Revieweis.   -Skin: As above in HPI. No additional skin concerns.    Physical exam:  Vitals: LMP 05/04/2012   GEN: This is a well developed, well-nourished female in no acute distress, in a pleasant mood.    SKIN: Total skin excluding the undergarment areas was performed. The exam included the head/face, neck, both arms, chest, back, abdomen, both legs, digits and/or nails. Pt has make up on face - declines removal. Declines genital exam.   - There are waxy stuck on tan to brown papules on the trunk.  - Scattered brown macules on sun exposed areas.  - There is no erythema, telangectasias, nodularity, or pigmentation on the right neck.  - There are dome shaped bright red papules on the trunk.   -No other lesions of concern on areas examined.     Impression/Plan:  1. History of nonmelanoma skin cancer, no evidence of recurrence.     Recommend sunscreens SPF #30 or greater, protective clothing and avoidance of tanning beds.Reviewed.     2. Solar lentigines, cherry angiomas, seborrheic keratoses    No further intervention needed.     Follow up in 1 year. Patient woud like to extend to 1.5 , she will report any lesions earlier.      Staff Involved:  Scribe/Staff    Scribe Disclosure  I, Leonel Isbell, am serving as a scribe  to document services personally performed by Dr. Jennifer Pham MD, based on data collection and the provider's statements to me.     Provider Disclosure:   The documentation recorded by the scribe accurately reflects the services I personally performed and the decisions made by me.    Jennifer Pham MD    Department of Dermatology  [facility]: Phone: [phone], Fax:[phone]  [facility]: Phone: [phone], Fax: [phone]          Again, thank you for allowing me to participate in the care of your patient.        Sincerely,        Jennifer Pham MD

## 2023-04-24 NOTE — LETTER
"    Patient:  Eula Cox  :   1945  MRN:     [id number]        Ms.Nancy Cox  [address]  [facility] [address]        April 3, 2017    Dear ,    We are writing to inform you of your test results that show a harmless keratosis on the arm and a normal mole on the back.     Thank you for taking the time to be seen in our dermatology clinic. If you have further questions or concerns, please contact the clinic(see phone number listed below).      Sincerely,    Jennifer Pham MD    Department of Dermatology  [facility]: Phone: [phone], Fax:[phone]  [facility]: Phone: [phone], Fax: [phone]    Resulted Orders   Surgical pathology exam   Result Value Ref Range    Copath Report       Patient Name: EULA COX  MR#: [id number]  Specimen #: [id number]  Collected: 3/28/2017  Received: 3/29/2017  Reported: 3/31/2017 13:55  Ordering Phy(s): JENNIFER PHAM    For improved result formatting, select 'View Enhanced Report Format'  under Linked Documents section.    SPECIMEN(S):  A: Skin, right forearm  B: Skin, central back    FINAL DIAGNOSIS:  A.  Skin, forearm, right:  - Benign lichenoid keratosis - (see description)    B.  Skin, back, center  - Intradermal melanocytic nevus - (see description)    I have personally reviewed all specimens and or slides, including the  listed special stains, and used them with my medical judgement to  determine the final diagnosis.    Electronically signed out by:    Huber Hernandez M.D., [facility]    CLINICAL HISTORY:  The patient is a 72-year-old female.    GROSS:  A. The specimen is received in formalin with proper patient  identification and labeled \"skin, right forearm\" and consists of a 0.5 x  0.4 cm shave, remarkable for erythematous  macule. The base margin is  inked black.  Bisected and entirely submitted in one cassette.    B. The specimen is " "received in formalin with proper patient  identification and labeled \"skin, center back\" and consists of a 0.4 x  0.4 cm shave, remarkable for skin colored papule. The base margin is  inked black.  Bisected and entirely submitted in one cassette. (Dictated  by: Eddie Shannon 3/29/2017 10:27 AM)    MICROSCOPIC:  A. The specimen exhibits basal layer vacuolization and occasional  necrotic keratinocytes above a patchy lichenoid lymphocytic infiltrate.  Adjacent changes of solar lentigo are also present, with increased basal  keratinocyte pigmentation.    B. The specimen shows intradermal nests and strands of benign-appearing  nevus cells without a prominent junctional component.  The lesion  extends to the deep margin.    CPT Codes:  A: 63134-SE7.T, 36548-JY8.P  B: 51514-EJ6.T, 28592-XH4.P    TESTING LAB LOCATION:  [address]   [address] [phone]    COLLECTION SITE:  Client: [facility]  Location: [facility] ([place])                       " TBD